# Patient Record
Sex: FEMALE | Race: WHITE | NOT HISPANIC OR LATINO
[De-identification: names, ages, dates, MRNs, and addresses within clinical notes are randomized per-mention and may not be internally consistent; named-entity substitution may affect disease eponyms.]

---

## 2017-08-03 ENCOUNTER — APPOINTMENT (OUTPATIENT)
Dept: OBGYN | Facility: CLINIC | Age: 27
End: 2017-08-03

## 2018-05-01 ENCOUNTER — LABORATORY RESULT (OUTPATIENT)
Age: 28
End: 2018-05-01

## 2018-05-02 ENCOUNTER — APPOINTMENT (OUTPATIENT)
Dept: OBGYN | Facility: CLINIC | Age: 28
End: 2018-05-02
Payer: MEDICAID

## 2018-05-02 ENCOUNTER — OUTPATIENT (OUTPATIENT)
Dept: OUTPATIENT SERVICES | Facility: HOSPITAL | Age: 28
LOS: 1 days | End: 2018-05-02
Payer: MEDICAID

## 2018-05-02 ENCOUNTER — LABORATORY RESULT (OUTPATIENT)
Age: 28
End: 2018-05-02

## 2018-05-02 VITALS — BODY MASS INDEX: 25.24 KG/M2 | SYSTOLIC BLOOD PRESSURE: 122 MMHG | WEIGHT: 166 LBS | DIASTOLIC BLOOD PRESSURE: 80 MMHG

## 2018-05-02 DIAGNOSIS — N94.6 DYSMENORRHEA, UNSPECIFIED: ICD-10-CM

## 2018-05-02 DIAGNOSIS — N76.0 ACUTE VAGINITIS: ICD-10-CM

## 2018-05-02 PROCEDURE — G0463: CPT

## 2018-05-02 PROCEDURE — 99395 PREV VISIT EST AGE 18-39: CPT | Mod: GC

## 2018-05-02 PROCEDURE — 87389 HIV-1 AG W/HIV-1&-2 AB AG IA: CPT

## 2018-05-02 PROCEDURE — 86803 HEPATITIS C AB TEST: CPT

## 2018-05-02 PROCEDURE — 86780 TREPONEMA PALLIDUM: CPT

## 2018-05-03 LAB
C TRACH RRNA SPEC QL NAA+PROBE: SIGNIFICANT CHANGE UP
HCV AB S/CO SERPL IA: 0.06 S/CO — SIGNIFICANT CHANGE UP
HCV AB SERPL-IMP: SIGNIFICANT CHANGE UP
HIV 1+2 AB+HIV1 P24 AG SERPL QL IA: SIGNIFICANT CHANGE UP
N GONORRHOEA RRNA SPEC QL NAA+PROBE: SIGNIFICANT CHANGE UP
SPECIMEN SOURCE: SIGNIFICANT CHANGE UP
T PALLIDUM AB TITR SER: NEGATIVE — SIGNIFICANT CHANGE UP

## 2018-05-07 LAB — CYTOLOGY SPEC DOC CYTO: SIGNIFICANT CHANGE UP

## 2018-05-09 DIAGNOSIS — N94.6 DYSMENORRHEA, UNSPECIFIED: ICD-10-CM

## 2018-05-09 DIAGNOSIS — Z11.3 ENCOUNTER FOR SCREENING FOR INFECTIONS WITH A PREDOMINANTLY SEXUAL MODE OF TRANSMISSION: ICD-10-CM

## 2018-05-09 DIAGNOSIS — Z01.419 ENCOUNTER FOR GYNECOLOGICAL EXAMINATION (GENERAL) (ROUTINE) WITHOUT ABNORMAL FINDINGS: ICD-10-CM

## 2018-07-15 ENCOUNTER — EMERGENCY (EMERGENCY)
Facility: HOSPITAL | Age: 28
LOS: 1 days | Discharge: HOME | End: 2018-07-15
Attending: EMERGENCY MEDICINE
Payer: MEDICAID

## 2018-07-15 VITALS
OXYGEN SATURATION: 98 % | HEART RATE: 83 BPM | DIASTOLIC BLOOD PRESSURE: 74 MMHG | SYSTOLIC BLOOD PRESSURE: 108 MMHG | RESPIRATION RATE: 18 BRPM | TEMPERATURE: 98 F

## 2018-07-15 PROCEDURE — 23650 CLTX SHO DSLC W/MNPJ WO ANES: CPT

## 2018-07-15 PROCEDURE — 99284 EMERGENCY DEPT VISIT MOD MDM: CPT | Mod: 25

## 2018-07-15 PROCEDURE — 73030 X-RAY EXAM OF SHOULDER: CPT

## 2018-07-15 PROCEDURE — 99283 EMERGENCY DEPT VISIT LOW MDM: CPT | Mod: 25

## 2018-07-15 PROCEDURE — 73030 X-RAY EXAM OF SHOULDER: CPT | Mod: 26,RT

## 2018-07-15 PROCEDURE — 73030 X-RAY EXAM OF SHOULDER: CPT | Mod: 26,77,RT

## 2018-07-15 PROCEDURE — 23650 CLTX SHO DSLC W/MNPJ WO ANES: CPT | Mod: RT

## 2018-07-15 NOTE — ED PROVIDER NOTE - PROGRESS NOTE DETAILS
Pt's shoulder dislocation successfully reduced.  Advised f/u with orthopedics as soon as possible.  Sling placed.  I spoke with on-call radiologist at [facility] and he says there is no e/o Fx and dislocation successfully reduced. Pt's shoulder dislocation successfully reduced without medications/sedation.  Advised f/u with orthopedics as soon as possible.  Sling placed.  Pt repeatedly declining any analgesia.  I spoke with on-call radiologist at [facility] and he says there is no e/o Fx and dislocation successfully reduced.

## 2018-07-15 NOTE — ED PROCEDURE NOTE - CPROC ED POST PROC CARE GUIDE1
ortho f/u/Verbal/written post procedure instructions were given to patient/caregiver./Instructed patient/caregiver to follow-up with primary care physician.

## 2018-07-15 NOTE — ED PROVIDER NOTE - OBJECTIVE STATEMENT
26 y/o F with no significant PMHx and no PSHx presents to the ED with a right shoulder dislocation x 1hr ago. Pt experiences pain on right upper extremities. Pt denies numbness, weakness or any other complaints. Pt states she was fixing ceiling fan when her right shoulder dislocated, she has not taken any medication for pain.  Pt states she has history of 6 shoulder dislocations however has no Hx of visiting orthopedic specialist. NKDA.

## 2018-07-15 NOTE — ED PROVIDER NOTE - MEDICAL DECISION MAKING DETAILS
28 y/o F with suspected atraumatic right shoulder dislocation with Hx of multiple dislocations. Ordered X-ray, attempt shoulder reduction, if dislocation reduction w/o sedation. Pt denies pain medications.

## 2018-11-30 NOTE — ED PROCEDURE NOTE - CPROC ED JOINT DISLOCATION DETAIL1
The anatomic location was identified, and patient was properly positioned. Using the appropriate technique, joint reduction was performed. parents

## 2019-08-20 ENCOUNTER — OUTPATIENT (OUTPATIENT)
Dept: OUTPATIENT SERVICES | Facility: HOSPITAL | Age: 29
LOS: 1 days | End: 2019-08-20
Payer: MEDICAID

## 2019-08-20 ENCOUNTER — APPOINTMENT (OUTPATIENT)
Dept: OBGYN | Facility: CLINIC | Age: 29
End: 2019-08-20
Payer: MEDICAID

## 2019-08-20 ENCOUNTER — LABORATORY RESULT (OUTPATIENT)
Age: 29
End: 2019-08-20

## 2019-08-20 VITALS
DIASTOLIC BLOOD PRESSURE: 74 MMHG | BODY MASS INDEX: 26.67 KG/M2 | SYSTOLIC BLOOD PRESSURE: 112 MMHG | WEIGHT: 176 LBS | HEIGHT: 68 IN

## 2019-08-20 DIAGNOSIS — N76.0 ACUTE VAGINITIS: ICD-10-CM

## 2019-08-20 PROCEDURE — G0463: CPT

## 2019-08-20 PROCEDURE — 99213 OFFICE O/P EST LOW 20 MIN: CPT | Mod: GE

## 2019-08-20 PROCEDURE — 87491 CHLMYD TRACH DNA AMP PROBE: CPT

## 2019-08-20 PROCEDURE — 87591 N.GONORRHOEAE DNA AMP PROB: CPT

## 2019-08-20 NOTE — PROCEDURE
[Intrauterine Pregnancy] : intrauterine pregnancy [Yolk Sac] : yolk sac present [CRL: ___ (mm)] : CRL - [unfilled]Umm [Fetal Heart] : fetal heart present [___ day(s)] : [unfilled] days [Current GA by Sonogram: ___ (wks)] : Current GA by Sonogram: [unfilled]Uwks [WNL] : Transvaginal OB Sonogram WNL

## 2019-08-20 NOTE — PHYSICAL EXAM
[No Lesions] : no genitalia lesions [Labia Majora] : labia major [Normal] : cervix [No Bleeding] : there was no active vaginal bleeding [Labia Minora] : labia minora [Nulliparous] : was nulliparous

## 2019-08-20 NOTE — HISTORY OF PRESENT ILLNESS
[Good] : being in good health [Healthy Diet] : a healthy diet [Reproductive Age] : is of reproductive age [Regular Exercise] : not exercising regularly [Weight Concerns] : no concerns with her weight

## 2019-08-21 LAB
C TRACH RRNA SPEC QL NAA+PROBE: SIGNIFICANT CHANGE UP
N GONORRHOEA RRNA SPEC QL NAA+PROBE: SIGNIFICANT CHANGE UP
SPECIMEN SOURCE: SIGNIFICANT CHANGE UP

## 2019-08-27 DIAGNOSIS — Z34.90 ENCOUNTER FOR SUPERVISION OF NORMAL PREGNANCY, UNSPECIFIED, UNSPECIFIED TRIMESTER: ICD-10-CM

## 2019-08-27 PROBLEM — Z86.19 HISTORY OF HPV INFECTION: Status: RESOLVED | Noted: 2019-08-27 | Resolved: 2019-08-27

## 2019-08-27 PROBLEM — Z86.59 HISTORY OF ANXIETY: Status: RESOLVED | Noted: 2019-08-27 | Resolved: 2019-08-27

## 2019-08-27 PROBLEM — E28.2 PCOS (POLYCYSTIC OVARIAN SYNDROME): Status: ACTIVE | Noted: 2019-08-27

## 2019-08-27 PROBLEM — Z01.419 ENCOUNTER FOR GYNECOLOGICAL EXAMINATION WITHOUT ABNORMAL FINDING: Status: RESOLVED | Noted: 2018-05-02 | Resolved: 2019-08-27

## 2019-08-27 PROBLEM — Z11.3 SCREEN FOR STD (SEXUALLY TRANSMITTED DISEASE): Status: RESOLVED | Noted: 2018-05-02 | Resolved: 2019-08-27

## 2019-08-28 ENCOUNTER — APPOINTMENT (OUTPATIENT)
Dept: OBGYN | Facility: CLINIC | Age: 29
End: 2019-08-28
Payer: MEDICAID

## 2019-08-28 ENCOUNTER — OUTPATIENT (OUTPATIENT)
Dept: OUTPATIENT SERVICES | Facility: HOSPITAL | Age: 29
LOS: 1 days | End: 2019-08-28
Payer: MEDICAID

## 2019-08-28 VITALS
DIASTOLIC BLOOD PRESSURE: 85 MMHG | WEIGHT: 175.05 LBS | HEIGHT: 68 IN | OXYGEN SATURATION: 99 % | RESPIRATION RATE: 13 BRPM | SYSTOLIC BLOOD PRESSURE: 124 MMHG | HEART RATE: 102 BPM | TEMPERATURE: 99 F

## 2019-08-28 VITALS — SYSTOLIC BLOOD PRESSURE: 110 MMHG | WEIGHT: 173 LBS | DIASTOLIC BLOOD PRESSURE: 74 MMHG | BODY MASS INDEX: 26.3 KG/M2

## 2019-08-28 DIAGNOSIS — Z72.0 TOBACCO USE: ICD-10-CM

## 2019-08-28 DIAGNOSIS — Z86.19 PERSONAL HISTORY OF OTHER INFECTIOUS AND PARASITIC DISEASES: ICD-10-CM

## 2019-08-28 DIAGNOSIS — Z86.59 PERSONAL HISTORY OF OTHER MENTAL AND BEHAVIORAL DISORDERS: ICD-10-CM

## 2019-08-28 DIAGNOSIS — Z01.419 ENCOUNTER FOR GYNECOLOGICAL EXAMINATION (GENERAL) (ROUTINE) W/OUT ABNORMAL FINDINGS: ICD-10-CM

## 2019-08-28 DIAGNOSIS — Z33.2 ENCOUNTER FOR ELECTIVE TERMINATION OF PREGNANCY: ICD-10-CM

## 2019-08-28 DIAGNOSIS — Z01.818 ENCOUNTER FOR OTHER PREPROCEDURAL EXAMINATION: ICD-10-CM

## 2019-08-28 DIAGNOSIS — N76.0 ACUTE VAGINITIS: ICD-10-CM

## 2019-08-28 DIAGNOSIS — Z11.3 ENCOUNTER FOR SCREENING FOR INFECTIONS WITH A PREDOMINANTLY SEXUAL MODE OF TRANSMISSION: ICD-10-CM

## 2019-08-28 DIAGNOSIS — Z87.891 PERSONAL HISTORY OF NICOTINE DEPENDENCE: ICD-10-CM

## 2019-08-28 DIAGNOSIS — Z87.42 PERSONAL HISTORY OF OTHER DISEASES OF THE FEMALE GENITAL TRACT: ICD-10-CM

## 2019-08-28 DIAGNOSIS — Z34.90 ENCOUNTER FOR SUPERVISION OF NORMAL PREGNANCY, UNSPECIFIED, UNSPECIFIED TRIMESTER: ICD-10-CM

## 2019-08-28 DIAGNOSIS — E28.2 POLYCYSTIC OVARIAN SYNDROME: ICD-10-CM

## 2019-08-28 LAB
BLD GP AB SCN SERPL QL: NEGATIVE — SIGNIFICANT CHANGE UP
HCT VFR BLD CALC: 40.1 % — SIGNIFICANT CHANGE UP (ref 34.5–45)
HGB BLD-MCNC: 14.3 G/DL — SIGNIFICANT CHANGE UP (ref 11.5–15.5)
MCHC RBC-ENTMCNC: 31.7 PG — SIGNIFICANT CHANGE UP (ref 27–34)
MCHC RBC-ENTMCNC: 35.6 GM/DL — SIGNIFICANT CHANGE UP (ref 32–36)
MCV RBC AUTO: 89.1 FL — SIGNIFICANT CHANGE UP (ref 80–100)
PLATELET # BLD AUTO: 235 K/UL — SIGNIFICANT CHANGE UP (ref 150–400)
RBC # BLD: 4.5 M/UL — SIGNIFICANT CHANGE UP (ref 3.8–5.2)
RBC # FLD: 11 % — SIGNIFICANT CHANGE UP (ref 10.3–14.5)
RH IG SCN BLD-IMP: POSITIVE — SIGNIFICANT CHANGE UP
WBC # BLD: 13.3 K/UL — HIGH (ref 3.8–10.5)
WBC # FLD AUTO: 13.3 K/UL — HIGH (ref 3.8–10.5)

## 2019-08-28 PROCEDURE — 86901 BLOOD TYPING SEROLOGIC RH(D): CPT

## 2019-08-28 PROCEDURE — 87389 HIV-1 AG W/HIV-1&-2 AB AG IA: CPT

## 2019-08-28 PROCEDURE — G0463: CPT

## 2019-08-28 PROCEDURE — 99215 OFFICE O/P EST HI 40 MIN: CPT | Mod: GC

## 2019-08-28 PROCEDURE — 86900 BLOOD TYPING SEROLOGIC ABO: CPT

## 2019-08-28 PROCEDURE — 86850 RBC ANTIBODY SCREEN: CPT

## 2019-08-28 PROCEDURE — 85027 COMPLETE CBC AUTOMATED: CPT

## 2019-08-28 RX ORDER — LIDOCAINE HCL 20 MG/ML
0.2 VIAL (ML) INJECTION ONCE
Refills: 0 | Status: DISCONTINUED | OUTPATIENT
Start: 2019-09-03 | End: 2019-09-18

## 2019-08-28 RX ORDER — NORETHINDRONE ACETATE AND ETHINYL ESTRADIOL, ETHINYL ESTRADIOL AND FERROUS FUMARATE 1MG-10(24)
1 MG-10 MCG / KIT ORAL DAILY
Qty: 30 | Refills: 5 | Status: DISCONTINUED | COMMUNITY
Start: 2018-05-02 | End: 2019-08-28

## 2019-08-28 RX ORDER — SODIUM CHLORIDE 9 MG/ML
3 INJECTION INTRAMUSCULAR; INTRAVENOUS; SUBCUTANEOUS EVERY 8 HOURS
Refills: 0 | Status: DISCONTINUED | OUTPATIENT
Start: 2019-09-03 | End: 2019-09-18

## 2019-08-28 NOTE — HISTORY OF PRESENT ILLNESS
[Definite:  ___ (Date)] : the last menstrual period was [unfilled] [Regular Cycle Intervals] : periods have been regular [Sexually Active] : is sexually active

## 2019-08-28 NOTE — H&P PST ADULT - NSICDXPROBLEM_GEN_ALL_CORE_FT
PROBLEM DIAGNOSES  Problem: Encounter for elective termination of pregnancy  Assessment and Plan: Induced  dilation and curettage  Preop instructions given.  Labs pending.  Pre-emptive analgesia preop

## 2019-08-28 NOTE — H&P PST ADULT - HISTORY OF PRESENT ILLNESS
Ms. Lewis is a healthy 28 year old woman who is 8 weeks gestation presenting for elective termination of pregnancy.

## 2019-08-29 LAB — HIV 1+2 AB+HIV1 P24 AG SERPL QL IA: SIGNIFICANT CHANGE UP

## 2019-09-02 ENCOUNTER — TRANSCRIPTION ENCOUNTER (OUTPATIENT)
Age: 29
End: 2019-09-02

## 2019-09-03 ENCOUNTER — RESULT REVIEW (OUTPATIENT)
Age: 29
End: 2019-09-03

## 2019-09-03 ENCOUNTER — OUTPATIENT (OUTPATIENT)
Dept: OUTPATIENT SERVICES | Facility: HOSPITAL | Age: 29
LOS: 1 days | End: 2019-09-03
Payer: MEDICAID

## 2019-09-03 ENCOUNTER — APPOINTMENT (OUTPATIENT)
Dept: OBGYN | Facility: CLINIC | Age: 29
End: 2019-09-03

## 2019-09-03 VITALS
SYSTOLIC BLOOD PRESSURE: 120 MMHG | RESPIRATION RATE: 16 BRPM | HEART RATE: 79 BPM | OXYGEN SATURATION: 100 % | DIASTOLIC BLOOD PRESSURE: 66 MMHG

## 2019-09-03 VITALS
OXYGEN SATURATION: 99 % | TEMPERATURE: 98 F | HEART RATE: 95 BPM | HEIGHT: 68 IN | SYSTOLIC BLOOD PRESSURE: 114 MMHG | WEIGHT: 175.05 LBS | RESPIRATION RATE: 13 BRPM | DIASTOLIC BLOOD PRESSURE: 78 MMHG

## 2019-09-03 DIAGNOSIS — Z01.818 ENCOUNTER FOR OTHER PREPROCEDURAL EXAMINATION: ICD-10-CM

## 2019-09-03 DIAGNOSIS — Z33.2 ENCOUNTER FOR ELECTIVE TERMINATION OF PREGNANCY: ICD-10-CM

## 2019-09-03 PROCEDURE — 59840 INDUCED ABORTION D&C: CPT

## 2019-09-03 PROCEDURE — 88305 TISSUE EXAM BY PATHOLOGIST: CPT | Mod: 26

## 2019-09-03 PROCEDURE — 88305 TISSUE EXAM BY PATHOLOGIST: CPT

## 2019-09-03 RX ORDER — ACETAMINOPHEN 500 MG
3 TABLET ORAL
Qty: 0 | Refills: 0 | DISCHARGE

## 2019-09-03 RX ORDER — IBUPROFEN 200 MG
3 TABLET ORAL
Qty: 0 | Refills: 0 | DISCHARGE

## 2019-09-03 RX ORDER — CELECOXIB 200 MG/1
200 CAPSULE ORAL ONCE
Refills: 0 | Status: COMPLETED | OUTPATIENT
Start: 2019-09-03 | End: 2019-09-03

## 2019-09-03 RX ORDER — ACETAMINOPHEN 500 MG
1000 TABLET ORAL ONCE
Refills: 0 | Status: COMPLETED | OUTPATIENT
Start: 2019-09-03 | End: 2019-09-03

## 2019-09-03 RX ADMIN — Medication 1000 MILLIGRAM(S): at 12:04

## 2019-09-03 RX ADMIN — CELECOXIB 200 MILLIGRAM(S): 200 CAPSULE ORAL at 12:04

## 2019-09-03 NOTE — ASU DISCHARGE PLAN (ADULT/PEDIATRIC) - CARE PROVIDER_API CALL
Wendi Schreiber)  OBSN  General  [address] [place] [id number]  Phone: [phone]  Fax: [phone]  Follow Up Time: 2 weeks

## 2019-09-03 NOTE — BRIEF OPERATIVE NOTE - NSICDXBRIEFPREOP_GEN_ALL_CORE_FT
PRE-OP DIAGNOSIS:  Unplanned pregnancy 03-Sep-2019 16:47:37  Lexi Campa PRE-OP DIAGNOSIS:  Unplanned pregnancy 03-Sep-2019 16:47:37 1. IUP @ 9 5/7 weeks  2. unplanned, undesired IUP Lexi Campa

## 2019-09-03 NOTE — ASU DISCHARGE PLAN (ADULT/PEDIATRIC) - CALL YOUR DOCTOR IF YOU HAVE ANY OF THE FOLLOWING:
Fever greater than (need to indicate Fahrenheit or Celsius)/Nausea and vomiting that does not stop/Wound/Surgical Site with redness, or foul smelling discharge or pus/Inability to tolerate liquids or foods/Bleeding that does not stop/Pain not relieved by Medications/Unable to urinate

## 2019-09-03 NOTE — BRIEF OPERATIVE NOTE - OPERATION/FINDINGS
Anteverted uterus approx. 9-wks size, POC identified and appropriate for gestational age, blood type: A+ Inserted straight catheter into bladder at the initiation of the case for decompression. UOP not calculated. Anteverted uterus approx. 9-wks size. POC identified and appropriate for gestational age, blood type: A+ Anteverted uterus approx. 9-wks size. gestational sac and villi appropriate for gestational age. blood type: A+

## 2019-09-03 NOTE — BRIEF OPERATIVE NOTE - NSICDXBRIEFPROCEDURE_GEN_ALL_CORE_FT
PROCEDURES:  Exam under anesthesia, pelvis 03-Sep-2019 16:47:29  Lexi Campa  Dilation and curettage for pregnancy termination 03-Sep-2019 16:47:11 under ultrasound guidance Lexi Campa PROCEDURES:  Exam under anesthesia, pelvis 03-Sep-2019 16:47:29  Lexi Campa  Dilation and curettage for pregnancy termination 03-Sep-2019 16:47:11 1. EUA  2. D+C under ultrasound guidance Lexi Campa

## 2019-09-06 ENCOUNTER — RESULT REVIEW (OUTPATIENT)
Age: 29
End: 2019-09-06

## 2019-09-06 LAB — SURGICAL PATHOLOGY STUDY: SIGNIFICANT CHANGE UP

## 2019-09-09 DIAGNOSIS — E28.2 POLYCYSTIC OVARIAN SYNDROME: ICD-10-CM

## 2019-09-09 DIAGNOSIS — Z86.19 PERSONAL HISTORY OF OTHER INFECTIOUS AND PARASITIC DISEASES: ICD-10-CM

## 2019-09-09 DIAGNOSIS — Z34.90 ENCOUNTER FOR SUPERVISION OF NORMAL PREGNANCY, UNSPECIFIED, UNSPECIFIED TRIMESTER: ICD-10-CM

## 2019-09-09 DIAGNOSIS — Z86.59 PERSONAL HISTORY OF OTHER MENTAL AND BEHAVIORAL DISORDERS: ICD-10-CM

## 2019-09-11 PROBLEM — T14.8XXA OTHER INJURY OF UNSPECIFIED BODY REGION, INITIAL ENCOUNTER: Chronic | Status: ACTIVE | Noted: 2019-08-28

## 2019-09-23 ENCOUNTER — APPOINTMENT (OUTPATIENT)
Dept: OBGYN | Facility: CLINIC | Age: 29
End: 2019-09-23

## 2020-08-17 ENCOUNTER — LABORATORY RESULT (OUTPATIENT)
Age: 30
End: 2020-08-17

## 2020-08-17 ENCOUNTER — APPOINTMENT (OUTPATIENT)
Dept: OBGYN | Facility: CLINIC | Age: 30
End: 2020-08-17
Payer: MEDICAID

## 2020-08-17 ENCOUNTER — OUTPATIENT (OUTPATIENT)
Dept: OUTPATIENT SERVICES | Facility: HOSPITAL | Age: 30
LOS: 1 days | End: 2020-08-17
Payer: MEDICAID

## 2020-08-17 VITALS — BODY MASS INDEX: 25.24 KG/M2 | WEIGHT: 166 LBS | SYSTOLIC BLOOD PRESSURE: 110 MMHG | DIASTOLIC BLOOD PRESSURE: 70 MMHG

## 2020-08-17 DIAGNOSIS — Z00.00 ENCOUNTER FOR GENERAL ADULT MEDICAL EXAMINATION W/OUT ABNORMAL FINDINGS: ICD-10-CM

## 2020-08-17 DIAGNOSIS — N76.0 ACUTE VAGINITIS: ICD-10-CM

## 2020-08-17 DIAGNOSIS — Z11.3 ENCOUNTER FOR SCREENING FOR INFECTIONS WITH A PREDOMINANTLY SEXUAL MODE OF TRANSMISSION: ICD-10-CM

## 2020-08-17 DIAGNOSIS — Z30.9 ENCOUNTER FOR CONTRACEPTIVE MANAGEMENT, UNSPECIFIED: ICD-10-CM

## 2020-08-17 DIAGNOSIS — Z01.419 ENCOUNTER FOR GYNECOLOGICAL EXAMINATION (GENERAL) (ROUTINE) W/OUT ABNORMAL FINDINGS: ICD-10-CM

## 2020-08-17 PROCEDURE — 88141 CYTOPATH C/V INTERPRET: CPT

## 2020-08-17 PROCEDURE — 99213 OFFICE O/P EST LOW 20 MIN: CPT | Mod: NC

## 2020-08-17 PROCEDURE — G0463: CPT

## 2020-08-17 PROCEDURE — 86780 TREPONEMA PALLIDUM: CPT

## 2020-08-17 PROCEDURE — 87389 HIV-1 AG W/HIV-1&-2 AB AG IA: CPT

## 2020-08-17 PROCEDURE — 87340 HEPATITIS B SURFACE AG IA: CPT

## 2020-08-17 PROCEDURE — 36415 COLL VENOUS BLD VENIPUNCTURE: CPT

## 2020-08-17 PROCEDURE — 36415 COLL VENOUS BLD VENIPUNCTURE: CPT | Mod: NC

## 2020-08-17 PROCEDURE — 87625 HPV TYPES 16 & 18 ONLY: CPT

## 2020-08-17 PROCEDURE — 87624 HPV HI-RISK TYP POOLED RSLT: CPT

## 2020-08-17 PROCEDURE — 87591 N.GONORRHOEAE DNA AMP PROB: CPT

## 2020-08-17 PROCEDURE — 87491 CHLMYD TRACH DNA AMP PROBE: CPT

## 2020-08-17 PROCEDURE — 81025 URINE PREGNANCY TEST: CPT

## 2020-08-17 NOTE — HISTORY OF PRESENT ILLNESS
[Definite:  ___ (Date)] : the last menstrual period was [unfilled] [Normal Amount/Duration] : was of a normal amount and duration [Sexually Active] : is sexually active [Contraception] : uses contraception [Condoms] : uses condoms [No] : no [Male ___] : [unfilled] male [Spotting Between  Menses] : no spotting between menses

## 2020-08-17 NOTE — PROCEDURE
[Tolerated Well] : the patient tolerated the procedure well [GC Chlamydia Culture] : GC Chlamydia Culture [No Complications] : there were no complications

## 2020-08-17 NOTE — PHYSICAL EXAM
[Alert] : alert [Awake] : awake [Soft] : soft [Oriented x3] : oriented to person, place, and time [No Lesions] : no genitalia lesions [Normal] : uterus [Labia Majora] : labia major [No Bleeding] : there was no active vaginal bleeding [Labia Minora] : labia minora [Normal Position] : in a normal position [Uterine Adnexae] : were not tender and not enlarged [Nl Sphincter Tone] : normal sphincter tone [Acute Distress] : no acute distress [LAD] : no lymphadenopathy [Thyroid Nodule] : no thyroid nodule [Mass] : no breast mass [Goiter] : no goiter [Axillary LAD] : no axillary lymphadenopathy [Nipple Discharge] : no nipple discharge [Depressed Mood] : not depressed [Tender] : non tender [Flat Affect] : affect not flat [Discharge] : had no discharge [IUD String] : did not have an IUD string protruding out [Motion Tenderness] : there was no cervical motion tenderness [Pap Obtained] : a Pap smear was not performed [Enlarged ___ wks] : not enlarged [Tenderness] : nontender [Mass ___ cm] : no uterine mass was palpated [Ovarian Mass (___ Cm)] : there were no adnexal masses [Adnexa Tenderness] : were not tender

## 2020-08-18 LAB
C TRACH RRNA SPEC QL NAA+PROBE: SIGNIFICANT CHANGE UP
HBV SURFACE AG SER-ACNC: SIGNIFICANT CHANGE UP
HIV 1+2 AB+HIV1 P24 AG SERPL QL IA: SIGNIFICANT CHANGE UP
HPV HIGH+LOW RISK DNA PNL CVX: DETECTED
N GONORRHOEA RRNA SPEC QL NAA+PROBE: SIGNIFICANT CHANGE UP
SPECIMEN SOURCE: SIGNIFICANT CHANGE UP
T PALLIDUM AB TITR SER: NEGATIVE — SIGNIFICANT CHANGE UP

## 2020-08-19 DIAGNOSIS — Z34.90 ENCOUNTER FOR SUPERVISION OF NORMAL PREGNANCY, UNSPECIFIED, UNSPECIFIED TRIMESTER: ICD-10-CM

## 2020-08-21 LAB
HPV GENOTYPE 16: SIGNIFICANT CHANGE UP
HPV GENOTYPE 18/45: SIGNIFICANT CHANGE UP

## 2020-08-23 LAB — CYTOLOGY SPEC DOC CYTO: SIGNIFICANT CHANGE UP

## 2020-08-25 DIAGNOSIS — Z11.3 ENCOUNTER FOR SCREENING FOR INFECTIONS WITH A PREDOMINANTLY SEXUAL MODE OF TRANSMISSION: ICD-10-CM

## 2020-08-25 DIAGNOSIS — Z01.419 ENCOUNTER FOR GYNECOLOGICAL EXAMINATION (GENERAL) (ROUTINE) WITHOUT ABNORMAL FINDINGS: ICD-10-CM

## 2020-09-02 ENCOUNTER — TRANSCRIPTION ENCOUNTER (OUTPATIENT)
Age: 30
End: 2020-09-02

## 2020-09-02 ENCOUNTER — LABORATORY RESULT (OUTPATIENT)
Age: 30
End: 2020-09-02

## 2020-09-02 ENCOUNTER — APPOINTMENT (OUTPATIENT)
Dept: OBGYN | Facility: CLINIC | Age: 30
End: 2020-09-02
Payer: MEDICAID

## 2020-09-02 ENCOUNTER — OUTPATIENT (OUTPATIENT)
Dept: OUTPATIENT SERVICES | Facility: HOSPITAL | Age: 30
LOS: 1 days | End: 2020-09-02
Payer: MEDICAID

## 2020-09-02 DIAGNOSIS — N76.0 ACUTE VAGINITIS: ICD-10-CM

## 2020-09-02 PROCEDURE — 99213 OFFICE O/P EST LOW 20 MIN: CPT | Mod: GE

## 2020-09-02 PROCEDURE — G0463: CPT

## 2020-09-03 NOTE — PROCEDURE
[ASCUS] : atypical squamous cells of undetermined significance [HPV high risk] : PCR positive for high risk HPV [Patient] : patient [Risks] : risks [Benefits] : benefits [Alternatives] : alternatives [Infection] : infection [SCJ Fully Visulized] : the squamocolumnar junction was fully visualized [Allergic Reaction] : allergic reaction [Acetowhite ___ o'clock] : ascetowhite changes at [unfilled] ~Uo'clock [Biopsies Taken: # ___] : [unfilled] biopsies taken of the cervix [No Abnormalities] : no abnormalities seen [Dougie's] : Dougie's solution [Biopsy Locations ___ o'clock] : the biopsies were taken at [unfilled] o'clock [Direct Pressure] : direct pressure [Silver Nitrate] : silver nitrate [Tolerated Well] : the patient tolerated the procedure well [Bleeding] : excesive bleeding was noted [ECC Done] : Endocervical curettage was performed.  [FreeTextEntry4] : hemostasis obtained with monsels and silver nitrate

## 2020-09-09 DIAGNOSIS — R87.619 UNSPECIFIED ABNORMAL CYTOLOGICAL FINDINGS IN SPECIMENS FROM CERVIX UTERI: ICD-10-CM

## 2020-10-07 ENCOUNTER — TRANSCRIPTION ENCOUNTER (OUTPATIENT)
Age: 30
End: 2020-10-07

## 2021-10-11 ENCOUNTER — NON-APPOINTMENT (OUTPATIENT)
Age: 31
End: 2021-10-11

## 2021-10-20 ENCOUNTER — LABORATORY RESULT (OUTPATIENT)
Age: 31
End: 2021-10-20

## 2021-10-20 ENCOUNTER — APPOINTMENT (OUTPATIENT)
Dept: OBGYN | Facility: CLINIC | Age: 31
End: 2021-10-20
Payer: MEDICAID

## 2021-10-20 ENCOUNTER — RESULT CHARGE (OUTPATIENT)
Age: 31
End: 2021-10-20

## 2021-10-20 ENCOUNTER — OUTPATIENT (OUTPATIENT)
Dept: OUTPATIENT SERVICES | Facility: HOSPITAL | Age: 31
LOS: 1 days | End: 2021-10-20
Payer: MEDICAID

## 2021-10-20 VITALS — BODY MASS INDEX: 27.07 KG/M2 | SYSTOLIC BLOOD PRESSURE: 112 MMHG | WEIGHT: 178 LBS | DIASTOLIC BLOOD PRESSURE: 78 MMHG

## 2021-10-20 DIAGNOSIS — Z01.419 ENCOUNTER FOR GYNECOLOGICAL EXAMINATION (GENERAL) (ROUTINE) W/OUT ABNORMAL FINDINGS: ICD-10-CM

## 2021-10-20 DIAGNOSIS — N76.0 ACUTE VAGINITIS: ICD-10-CM

## 2021-10-20 DIAGNOSIS — N87.0 MILD CERVICAL DYSPLASIA: ICD-10-CM

## 2021-10-20 LAB
BILIRUB UR QL STRIP: NORMAL
GLUCOSE UR-MCNC: NORMAL
HCG UR QL: 0.2 EU/DL
HCG UR QL: NEGATIVE
HGB UR QL STRIP.AUTO: NORMAL
KETONES UR-MCNC: NORMAL
LEUKOCYTE ESTERASE UR QL STRIP: NORMAL
NITRITE UR QL STRIP: NORMAL
PH UR STRIP: 7
PROT UR STRIP-MCNC: NORMAL
SP GR UR STRIP: 1.01

## 2021-10-20 PROCEDURE — G0463: CPT

## 2021-10-20 PROCEDURE — 99213 OFFICE O/P EST LOW 20 MIN: CPT

## 2021-10-20 PROCEDURE — 87624 HPV HI-RISK TYP POOLED RSLT: CPT

## 2021-10-20 RX ORDER — NORGESTIMATE AND ETHINYL ESTRADIOL 0.25-0.035
0.25-35 KIT ORAL DAILY
Qty: 1 | Refills: 5 | Status: COMPLETED | COMMUNITY
Start: 2019-08-28 | End: 2021-10-20

## 2021-10-20 NOTE — HISTORY OF PRESENT ILLNESS
[FreeTextEntry1] : 31yo  (LMP 21) presents for annual gyn exam.  Reg menses, denies pelvic pain. Sex active, monogamous.  Condoms.  Pt noticed recent change in menses since COVID vaccinations.  \par \par - PAP 2020 ASCUS +HPV \par - COLPO 2020-  ZAHRAA 1\par - Gardasil- never\par \par Gen health - just started with outside PCP\par - s/p COVID vaccine

## 2021-10-20 NOTE — DISCUSSION/SUMMARY
[FreeTextEntry1] : 31yo P0- annual gyn exam\par # h/o CIN1 on colpo 9/2020\par - [ ]pap/hpv collected today\par - refused gardasil today\par # gyn\par - declined contraception- will cont w/ condoms\par - declined STD screen\par - gen health followed by new outside PCP\par \par RTC for shira/prn or if abnl pap\par Clint Saez, PAC

## 2021-10-21 LAB — HPV HIGH+LOW RISK DNA PNL CVX: SIGNIFICANT CHANGE UP

## 2021-10-25 LAB — CYTOLOGY SPEC DOC CYTO: SIGNIFICANT CHANGE UP

## 2021-10-28 DIAGNOSIS — Z01.419 ENCOUNTER FOR GYNECOLOGICAL EXAMINATION (GENERAL) (ROUTINE) WITHOUT ABNORMAL FINDINGS: ICD-10-CM

## 2021-10-28 DIAGNOSIS — N87.0 MILD CERVICAL DYSPLASIA: ICD-10-CM

## 2022-08-22 NOTE — COUNSELING
[Nutrition] : nutrition [Breast Self Exam] : breast self exam [Vitamins/Supplements] : vitamins/supplements [STD (testing, results, tx)] : STD (testing, results, tx) [Exercise] : exercise actual/standing

## 2024-06-20 ENCOUNTER — NON-APPOINTMENT (OUTPATIENT)
Age: 34
End: 2024-06-20

## 2024-07-12 ENCOUNTER — APPOINTMENT (OUTPATIENT)
Dept: OBGYN | Facility: CLINIC | Age: 34
End: 2024-07-12

## 2024-11-21 NOTE — ASU PATIENT PROFILE, ADULT - ABILITY TO HEAR (WITH HEARING AID OR HEARING APPLIANCE IF NORMALLY USED):
Airway    Date/Time: 11/21/2024 10:25 AM    Performed by: Gutierrez Eisenberg M.D.  Authorized by: Gutierrez Eisenberg M.D.    Location:  OR  Urgency:  Elective  Indications for Airway Management:  Anesthesia      Spontaneous Ventilation: absent    Sedation Level:  Deep  Preoxygenated: Yes    Patient Position:  Sniffing  Final Airway Type:  Endotracheal airway  Final Endotracheal Airway:  ETT  Cuffed: Yes    Technique Used for Successful ETT Placement:  Direct laryngoscopy    Insertion Site:  Oral  Blade Type:  Sadaf  Laryngoscope Blade/Videolaryngoscope Blade Size:  4  ETT Size (mm):  8.0  Measured from:  Teeth  ETT to Teeth (cm):  23  Placement Verified by: auscultation and capnometry    Cormack-Lehane Classification:  Grade I - full view of glottis  Number of Attempts at Approach:  1         Adequate: hears normal conversation without difficulty

## 2025-03-23 NOTE — ED PROVIDER NOTE - LOCATION
23-Mar-2025 00:00
Previous Labs: Yes
How Did The Hair Loss Occur?: sudden in onset
How Severe Is Your Hair Loss?: mild
shoulder